# Patient Record
Sex: FEMALE | Race: WHITE | NOT HISPANIC OR LATINO | Employment: FULL TIME | URBAN - METROPOLITAN AREA
[De-identification: names, ages, dates, MRNs, and addresses within clinical notes are randomized per-mention and may not be internally consistent; named-entity substitution may affect disease eponyms.]

---

## 2021-06-29 ENCOUNTER — OFFICE VISIT (OUTPATIENT)
Dept: FAMILY MEDICINE CLINIC | Facility: CLINIC | Age: 51
End: 2021-06-29

## 2021-06-29 VITALS
DIASTOLIC BLOOD PRESSURE: 82 MMHG | RESPIRATION RATE: 18 BRPM | BODY MASS INDEX: 30.92 KG/M2 | TEMPERATURE: 98.6 F | HEIGHT: 70 IN | HEART RATE: 72 BPM | SYSTOLIC BLOOD PRESSURE: 114 MMHG | WEIGHT: 216 LBS

## 2021-06-29 DIAGNOSIS — E55.9 VITAMIN D DEFICIENCY: ICD-10-CM

## 2021-06-29 DIAGNOSIS — Z12.11 SCREEN FOR COLON CANCER: ICD-10-CM

## 2021-06-29 DIAGNOSIS — Z00.00 WELL WOMAN EXAM (NO GYNECOLOGICAL EXAM): Primary | ICD-10-CM

## 2021-06-29 DIAGNOSIS — R23.2 HOT FLASHES: ICD-10-CM

## 2021-06-29 DIAGNOSIS — Z83.42 FAMILY HISTORY OF HYPERCHOLESTEROLEMIA: ICD-10-CM

## 2021-06-29 DIAGNOSIS — Z11.59 ENCOUNTER FOR HEPATITIS C SCREENING TEST FOR LOW RISK PATIENT: ICD-10-CM

## 2021-06-29 DIAGNOSIS — R63.5 WEIGHT GAIN: ICD-10-CM

## 2021-06-29 PROBLEM — Z90.13 ACQUIRED ABSENCE OF BOTH BREASTS AND NIPPLES: Status: ACTIVE | Noted: 2017-11-01

## 2021-06-29 PROBLEM — N65.0 DEFORMITY OF RECONSTRUCTED BREAST: Status: ACTIVE | Noted: 2017-11-01

## 2021-06-29 PROCEDURE — 99386 PREV VISIT NEW AGE 40-64: CPT | Performed by: FAMILY MEDICINE

## 2021-06-29 NOTE — PROGRESS NOTES
"Subjective     Chief Complaint   Patient presents with   • [facility]       Marilou Mcgowan is a 50 y.o. female who presents for an annual exam. The patient is sexually active. GYN screening history: was normal and she had hysterectomy in the past. The patient wears seatbelts: yes. The patient participates in regular exercise: yes.  Gym 1 hour several times a week.   The patient reports that there is not domestic violence in her life.     History of abnormal Pap smear: no  Family history of uterine or ovarian cancer: no  Family history of colon cancer: no  History of abnormal mammogram: yes - she had mastectomy  Colonoscopy history:   Never had this completed      Menstrual History:  OB History        2    Para   2    Term                AB        Living           SAB        TAB        Ectopic        Molar        Multiple        Live Births                     No LMP recorded.         The following portions of the patient's history were reviewed and updated as appropriate:vital signs, allergies, current medications, past family history, past medical history, past social history, past surgical history and problem list    Review of Systems   A comprehensive review of systems was negative.   She has been stressed but notes her mood overall is good    Objective     /82   Pulse 72   Temp 98.6 °F (37 °C)   Resp 18   Ht 177.8 cm (70\")   Wt 98 kg (216 lb)   BMI 30.99 kg/m²       Physical Exam  Vitals and nursing note reviewed.   Constitutional:       General: She is not in acute distress.     Appearance: Normal appearance. She is well-developed.   HENT:      Head: Normocephalic and atraumatic.      Right Ear: Tympanic membrane, ear canal and external ear normal.      Left Ear: Tympanic membrane, ear canal and external ear normal.      Nose: Nose normal.   Eyes:      Extraocular Movements: Extraocular movements intact.      Conjunctiva/sclera: Conjunctivae normal.   Neck:      Thyroid: No " thyromegaly.   Cardiovascular:      Rate and Rhythm: Normal rate and regular rhythm.      Heart sounds: Normal heart sounds. No murmur heard.     Pulmonary:      Effort: Pulmonary effort is normal. No respiratory distress.      Breath sounds: Normal breath sounds.   Abdominal:      General: Bowel sounds are normal. There is no distension.      Palpations: Abdomen is soft.      Tenderness: There is no abdominal tenderness.   Musculoskeletal:      Cervical back: Normal range of motion and neck supple.   Lymphadenopathy:      Cervical: No cervical adenopathy.   Skin:     General: Skin is warm and dry.   Neurological:      Mental Status: She is alert and oriented to person, place, and time.   Psychiatric:         Mood and Affect: Mood normal.         Behavior: Behavior normal.         Thought Content: Thought content normal.         Judgment: Judgment normal.             Assessment/Plan     ASSESSMENT  Healthy female exam.    1. Well woman exam (no gynecological exam)    2. Hot flashes    3. Family history of hypercholesterolemia    4. Screen for colon cancer    5. Encounter for hepatitis C screening test for low risk patient    6. Vitamin D deficiency    7. Weight gain         PLAN  1.   Orders Placed This Encounter   Procedures   • Comprehensive Metabolic Panel   • Lipid Panel   • TSH+Free T4   • Vitamin D 25 Hydroxy   • Hepatitis C Antibody   • FSH & LH   • Estrogens, Total   • Progesterone   • Cologuard - Stool, Per Rectum   • CBC & Differential       2. Medications prescribed this encounter:    No orders of the defined types were placed in this encounter.      3. Counseled pt about well woman care including diet and exercise.  She is exercising and doing low carb with intermittent fasting but not losing weight, will check labs and f/u pending results.  She had hysterecomy and mastecomy so no need for pap nor mammogram.  Will set up cologuard    Will f/u pending labs        Flo Montes De Oca MD  6/29/2021

## 2021-07-06 LAB
25(OH)D3+25(OH)D2 SERPL-MCNC: 40.7 NG/ML (ref 30–100)
ALBUMIN SERPL-MCNC: 4.8 G/DL (ref 3.8–4.9)
ALBUMIN/GLOB SERPL: 2.7 {RATIO} (ref 1.2–2.2)
ALP SERPL-CCNC: 58 IU/L (ref 48–121)
ALT SERPL-CCNC: 14 IU/L (ref 0–32)
AST SERPL-CCNC: 16 IU/L (ref 0–40)
BASOPHILS # BLD AUTO: 0 X10E3/UL (ref 0–0.2)
BASOPHILS NFR BLD AUTO: 0 %
BILIRUB SERPL-MCNC: 0.4 MG/DL (ref 0–1.2)
BUN SERPL-MCNC: 10 MG/DL (ref 6–24)
BUN/CREAT SERPL: 11 (ref 9–23)
CALCIUM SERPL-MCNC: 9.3 MG/DL (ref 8.7–10.2)
CHLORIDE SERPL-SCNC: 105 MMOL/L (ref 96–106)
CHOLEST SERPL-MCNC: 196 MG/DL (ref 100–199)
CO2 SERPL-SCNC: 24 MMOL/L (ref 20–29)
CREAT SERPL-MCNC: 0.9 MG/DL (ref 0.57–1)
EOSINOPHIL # BLD AUTO: 0.1 X10E3/UL (ref 0–0.4)
EOSINOPHIL NFR BLD AUTO: 1 %
ERYTHROCYTE [DISTWIDTH] IN BLOOD BY AUTOMATED COUNT: 13 % (ref 11.7–15.4)
ESTROGEN SERPL-MCNC: 145 PG/ML
FSH SERPL-ACNC: 44.5 MIU/ML
GLOBULIN SER CALC-MCNC: 1.8 G/DL (ref 1.5–4.5)
GLUCOSE SERPL-MCNC: 91 MG/DL (ref 65–99)
HCT VFR BLD AUTO: 39.4 % (ref 34–46.6)
HCV AB S/CO SERPL IA: <0.1 S/CO RATIO (ref 0–0.9)
HDLC SERPL-MCNC: 72 MG/DL
HGB BLD-MCNC: 13.2 G/DL (ref 11.1–15.9)
IMM GRANULOCYTES # BLD AUTO: 0 X10E3/UL (ref 0–0.1)
IMM GRANULOCYTES NFR BLD AUTO: 0 %
LDLC SERPL CALC-MCNC: 110 MG/DL (ref 0–99)
LH SERPL-ACNC: 38 MIU/ML
LYMPHOCYTES # BLD AUTO: 1.3 X10E3/UL (ref 0.7–3.1)
LYMPHOCYTES NFR BLD AUTO: 21 %
MCH RBC QN AUTO: 31.2 PG (ref 26.6–33)
MCHC RBC AUTO-ENTMCNC: 33.5 G/DL (ref 31.5–35.7)
MCV RBC AUTO: 93 FL (ref 79–97)
MONOCYTES # BLD AUTO: 0.4 X10E3/UL (ref 0.1–0.9)
MONOCYTES NFR BLD AUTO: 6 %
NEUTROPHILS # BLD AUTO: 4.4 X10E3/UL (ref 1.4–7)
NEUTROPHILS NFR BLD AUTO: 72 %
PLATELET # BLD AUTO: 254 X10E3/UL (ref 150–450)
POTASSIUM SERPL-SCNC: 4.1 MMOL/L (ref 3.5–5.2)
PROGEST SERPL-MCNC: 0.4 NG/ML
PROT SERPL-MCNC: 6.6 G/DL (ref 6–8.5)
RBC # BLD AUTO: 4.23 X10E6/UL (ref 3.77–5.28)
SODIUM SERPL-SCNC: 141 MMOL/L (ref 134–144)
T4 FREE SERPL-MCNC: 0.97 NG/DL (ref 0.82–1.77)
TRIGL SERPL-MCNC: 76 MG/DL (ref 0–149)
TSH SERPL DL<=0.005 MIU/L-ACNC: 1.99 UIU/ML (ref 0.45–4.5)
VLDLC SERPL CALC-MCNC: 14 MG/DL (ref 5–40)
WBC # BLD AUTO: 6.1 X10E3/UL (ref 3.4–10.8)

## 2022-03-15 ENCOUNTER — OFFICE VISIT (OUTPATIENT)
Dept: BARIATRICS/WEIGHT MGMT | Facility: CLINIC | Age: 52
End: 2022-03-15

## 2022-03-15 VITALS
DIASTOLIC BLOOD PRESSURE: 66 MMHG | OXYGEN SATURATION: 98 % | SYSTOLIC BLOOD PRESSURE: 124 MMHG | HEART RATE: 59 BPM | RESPIRATION RATE: 18 BRPM | TEMPERATURE: 98.4 F | HEIGHT: 70 IN | WEIGHT: 213.5 LBS | BODY MASS INDEX: 30.56 KG/M2

## 2022-03-15 DIAGNOSIS — R53.83 OTHER FATIGUE: ICD-10-CM

## 2022-03-15 DIAGNOSIS — Z51.81 ENCOUNTER FOR THERAPEUTIC DRUG LEVEL MONITORING: ICD-10-CM

## 2022-03-15 DIAGNOSIS — E66.09 CLASS 1 OBESITY DUE TO EXCESS CALORIES WITHOUT SERIOUS COMORBIDITY WITH BODY MASS INDEX (BMI) OF 30.0 TO 30.9 IN ADULT: Primary | ICD-10-CM

## 2022-03-15 PROCEDURE — 99205 OFFICE O/P NEW HI 60 MIN: CPT | Performed by: NURSE PRACTITIONER

## 2022-03-15 RX ORDER — CHROMIUM 200 MCG
1 TABLET ORAL 2 TIMES DAILY WITH MEALS
Qty: 60 EACH | Refills: 0
Start: 2022-03-15

## 2022-03-15 RX ORDER — MULTIPLE VITAMINS W/ MINERALS TAB 9MG-400MCG
1 TAB ORAL DAILY
Qty: 30 TABLET | Refills: 2
Start: 2022-03-15

## 2022-03-15 RX ORDER — CHLORAL HYDRATE 500 MG
1000 CAPSULE ORAL 2 TIMES DAILY WITH MEALS
Start: 2022-03-15

## 2022-03-15 NOTE — PROGRESS NOTES
"  [facility]  [address]  [place] [address]   Office Note      Date: 03/15/2022  Patient Name: Marilou Mcgowan  MRN: [id number]  : 1970  Subjective  Subjective     Chief Complaint  Obesity Management follow-up          Marilou Mcgowan presents to [facility] for obesity management. Gained 50lb in 3 months in  due to stress eating/comfort eating. Was at 180lb prior to the shutdown doing weight loss competition at work. She recently started a carb cycling/intermittent fasting with [employer]. Has lost 10lb in the last 5 weeks. She has two family members who do this program and she would like to start a medically supervised weight loss program for education and accountability.   Highest lifetime weight: 230 pounds (). Today's weight is 96.8 kg (213 lb 8 oz) pounds.   Weight 5 years ago:190.   She has a history of stage 0 breast cancer for which she had a double mastectomy then implants. She had a ruptured appendix when she was 19 at 135lb, lost 13lb in the hospital. States she was \"too thin\". Denies any concerns about disordered eating.  Sleep is good, uses CBD if she needs it.   Denies any heart history, worries that the stress of the pandemic damaged her heart. Does have occasional chest pains when she is really stressed. She has not mentioned this to her PCP.   The patient is not exercising but she is starting with personal training group once a week. Belongs to gym, could go in the mornings. Has recumbent bike at home.  The following seem to sabotage weight loss efforts:comfort/stress eating and Pandemic    Review of Systems   Constitutional: Positive for fatigue.        Positive for weight gain   HENT: Negative for trouble swallowing.         Negative for throat swelling   Respiratory: Negative for shortness of breath and wheezing.         Negative for snoring   Cardiovascular: Positive for chest pain (when " "stressed sometimes). Negative for palpitations and leg swelling.   Gastrointestinal: Negative for abdominal pain, constipation, diarrhea, GERD and indigestion.   Endocrine: Negative for cold intolerance, heat intolerance, polydipsia, polyphagia and polyuria.        Negative for loss of hair  Negative for hirsutism     Genitourinary:        Denies menstrual irregularities   Musculoskeletal: Negative for arthralgias.        Denies exercise limitations  Denies chronic pain   Skin: Negative for dry skin.        Negative for acne   Neurological: Negative for headache and memory problem.        Negative for paresthesias   Psychiatric/Behavioral: Negative for self-injury, sleep disturbance, suicidal ideas and depressed mood. The patient is not nervous/anxious.      PHQ-9 Total Score:   2    Objective   Body mass index is 30.63 kg/m².  Body composition analysis completed and showed:   %body fat: 43.6    Measurements (in inches)  Neck: 13.2  Chest: 42  Waist: 42  Hips: 47  Thighs: 37    Vital Signs:   /66 (BP Location: Right arm, Patient Position: Sitting, Cuff Size: Adult)   Pulse 59   Temp 98.4 °F (36.9 °C) (Temporal)   Resp 18   Ht 177.8 cm (70\")   Wt 96.8 kg (213 lb 8 oz)   SpO2 98%   BMI 30.63 kg/m²     Physical Exam   General appears stated age and normal appearance   HEENT PERRLA, EOM intact, conjunctivae normal and without thyromegaly or thyroid nodules   Chest/lungs Normal rate, Regular rhythm, Breathing is unlabored and Clear to auscultation bilaterally   Abdomen Hypopigmented linear striae, Soft, normal bowel sounds, without mass or tenderness and Central adiposity   Extremities without edema   Neuro Normal DTRs, Good historian and No focal deficit   Skin Warm, dry, intact   Psych normal behavior, normal thought content and normal concentration     Result Review :                   Assessment / Plan       Diagnoses and all orders for this visit:    1. Class 1 obesity due to excess calories without " serious comorbidity with body mass index (BMI) of 30.0 to 30.9 in adult (Primary)  Assessment & Plan:  Patient's (Body mass index is 30.63 kg/m².) indicates that they are obese (BMI >30) with health conditions that include none . Weight is newly identified. BMI is is above average; BMI management plan is completed. We discussed low calorie, low carb based diet program, portion control, increasing exercise, joining a fitness center or start home based exercise program, management of depression/anxiety/stress to control compensatory eating and an michelle-based approach such as [employer] Pal or Lose It.    Topics of discussion included obesity as a disease, nutritional education on food groups, exercise, and medications. Patient was instructed in adequate protein, controlled carb and controlled fat intake.   Patient received instructions on using the medicines as a tool in controlling their weight with nutritional and behavioral changes. Risks and benefits were discussed. I believe the potential benefits of medication helping to decrease weight outweighs the risks. Patient is to try nutritonal/behavioral changes only first.   Patient received our clinic education booklet.   Our patient consent form was reviewed including potential risks of weight loss. We also reviewed our confidentiality and HIPPA statements. Patients current FITT score was reviewed along with current capability for exercise tolerance and a patient will work towards a FITT score of:     Frequency   Intensity Time Strength Training   []   0 None  []   0 None  []   0 None  []   0 None    []   1 (1-2x/week) []   1 (light) []   1 (<10 min) []   1 (1x/week)   [x]   2 (3-5x/week) [x]   2 (moderate) []   2 (10-20 min) [x]   2 (2x/week)   []   3 (daily)   []   3 (moderately hard)  []   4 (very hard) []   3 (20-30 min)  [x]   4 (>30 min) []   3 (3-4x/week)       Patient's past medical history was reviewed in detail and barriers to weight loss were identified and  discussed. Past efforts at weight reduction on their own as well as under physician supervision were documented and discussed.  I advised patient to continue routine care with their Primary Care Provider.     Nutritional recommendations and goals were reviewed including Calories:0130-0790 daily adjusted for exercise calories burnt, Protein:>100 g daily, Net carbs (total carb - fiber) of 50-75g per day.  Start to keep a food journal and bring into next visit in 2 weeks for review. Practice the behavioral modification technique of mindful eating. Take one MVI daily and 2000mg fish oil daily. Take other medications and supplements as directed.   Complete labwork.  Check on AOM coverage.  Decrease calories to 1731-6314 a day (currently at 1500 with external diet program).           Orders:  -     Insulin, Total  -     CBC & Differential  -     Comprehensive Metabolic Panel  -     Hemoglobin A1c  -     Lipid Panel  -     TSH  -     Vitamin D 25 Hydroxy  -     Chromium (Chromium GTF) 200 MCG tablet; Take 1 tablet by mouth 2 (Two) Times a Day With Meals.  Dispense: 60 each; Refill: 0  -     Omega-3 Fatty Acids (fish oil) 1000 MG capsule capsule; Take 1 capsule by mouth 2 (Two) Times a Day With Meals.  -     multivitamin with minerals (Multivitamin Adults) tablet tablet; Take 1 tablet by mouth Daily.  Dispense: 30 tablet; Refill: 2    2. Encounter for therapeutic drug level monitoring  -     Urine Drug Screen - Urine, Clean Catch    3. Other fatigue  -     CBC & Differential  -     TSH  -     Vitamin D 25 Hydroxy       I spent 76 minutes caring for Marilou on this date of service. This time includes time spent by me in the following activities:preparing for the visit, obtaining and/or reviewing a separately obtained history, performing a medically appropriate examination and/or evaluation , counseling and educating the patient/family/caregiver, ordering medications, tests, or procedures and documenting information in the  medical record  Follow Up   Return in about 2 weeks (around 3/29/2022) for Next scheduled follow up, Labs this visit.  Patient was given instructions and counseling regarding her condition or for health maintenance advice. Please see specific information pulled into the AVS if appropriate.     Marian Humphrey, WILTON  03/15/2022

## 2022-03-15 NOTE — ASSESSMENT & PLAN NOTE
Patient's (Body mass index is 30.63 kg/m².) indicates that they are obese (BMI >30) with health conditions that include none . Weight is newly identified. BMI is is above average; BMI management plan is completed. We discussed low calorie, low carb based diet program, portion control, increasing exercise, joining a fitness center or start home based exercise program, management of depression/anxiety/stress to control compensatory eating and an michelle-based approach such as [employer] Pal or Lose It.    Topics of discussion included obesity as a disease, nutritional education on food groups, exercise, and medications. Patient was instructed in adequate protein, controlled carb and controlled fat intake.   Patient received instructions on using the medicines as a tool in controlling their weight with nutritional and behavioral changes. Risks and benefits were discussed. I believe the potential benefits of medication helping to decrease weight outweighs the risks. Patient is to try nutritonal/behavioral changes only first.   Patient received our clinic education booklet.   Our patient consent form was reviewed including potential risks of weight loss. We also reviewed our confidentiality and HIPPA statements. Patients current FITT score was reviewed along with current capability for exercise tolerance and a patient will work towards a FITT score of:     Frequency   Intensity Time Strength Training   []   0 None  []   0 None  []   0 None  []   0 None    []   1 (1-2x/week) []   1 (light) []   1 (<10 min) []   1 (1x/week)   [x]   2 (3-5x/week) [x]   2 (moderate) []   2 (10-20 min) [x]   2 (2x/week)   []   3 (daily)   []   3 (moderately hard)  []   4 (very hard) []   3 (20-30 min)  [x]   4 (>30 min) []   3 (3-4x/week)       Patient's past medical history was reviewed in detail and barriers to weight loss were identified and discussed. Past efforts at weight reduction on their own as well as under physician supervision were  documented and discussed.  I advised patient to continue routine care with their Primary Care Provider.     Nutritional recommendations and goals were reviewed including Calories:6150-2298 daily adjusted for exercise calories burnt, Protein:>100 g daily, Net carbs (total carb - fiber) of 50-75g per day.  Start to keep a food journal and bring into next visit in 2 weeks for review. Practice the behavioral modification technique of mindful eating. Take one MVI daily and 2000mg fish oil daily. Take other medications and supplements as directed.   Complete labwork.  Check on AOM coverage.  Decrease calories to 2101-3165 a day (currently at 1500 with external diet program).

## 2022-03-17 LAB
25(OH)D3+25(OH)D2 SERPL-MCNC: 43.7 NG/ML (ref 30–100)
ALBUMIN SERPL-MCNC: 4.5 G/DL (ref 3.8–4.9)
ALBUMIN/GLOB SERPL: 2 {RATIO} (ref 1.2–2.2)
ALP SERPL-CCNC: 67 IU/L (ref 44–121)
ALT SERPL-CCNC: 14 IU/L (ref 0–32)
AMPHETAMINES UR QL SCN: NEGATIVE NG/ML
AST SERPL-CCNC: 20 IU/L (ref 0–40)
BARBITURATES UR QL SCN: NEGATIVE NG/ML
BASOPHILS # BLD AUTO: 0 X10E3/UL (ref 0–0.2)
BASOPHILS NFR BLD AUTO: 0 %
BENZODIAZ UR QL SCN: NEGATIVE NG/ML
BILIRUB SERPL-MCNC: 0.5 MG/DL (ref 0–1.2)
BUN SERPL-MCNC: 13 MG/DL (ref 6–24)
BUN/CREAT SERPL: 16 (ref 9–23)
BZE UR QL SCN: NEGATIVE NG/ML
CALCIUM SERPL-MCNC: 9.5 MG/DL (ref 8.7–10.2)
CANNABINOIDS UR QL SCN: NEGATIVE NG/ML
CHLORIDE SERPL-SCNC: 101 MMOL/L (ref 96–106)
CHOLEST SERPL-MCNC: 191 MG/DL (ref 100–199)
CO2 SERPL-SCNC: 25 MMOL/L (ref 20–29)
CREAT SERPL-MCNC: 0.79 MG/DL (ref 0.57–1)
CREAT UR-MCNC: 104 MG/DL (ref 20–300)
EGFRCR SERPLBLD CKD-EPI 2021: 90 ML/MIN/1.73
EOSINOPHIL # BLD AUTO: 0 X10E3/UL (ref 0–0.4)
EOSINOPHIL NFR BLD AUTO: 0 %
ERYTHROCYTE [DISTWIDTH] IN BLOOD BY AUTOMATED COUNT: 13 % (ref 11.7–15.4)
GLOBULIN SER CALC-MCNC: 2.3 G/DL (ref 1.5–4.5)
GLUCOSE SERPL-MCNC: 89 MG/DL (ref 65–99)
HBA1C MFR BLD: 5.3 % (ref 4.8–5.6)
HCT VFR BLD AUTO: 36.5 % (ref 34–46.6)
HDLC SERPL-MCNC: 80 MG/DL
HGB BLD-MCNC: 12.2 G/DL (ref 11.1–15.9)
IMM GRANULOCYTES # BLD AUTO: 0 X10E3/UL (ref 0–0.1)
IMM GRANULOCYTES NFR BLD AUTO: 0 %
INSULIN SERPL-ACNC: 5.3 UIU/ML (ref 2.6–24.9)
LABORATORY COMMENT REPORT: NORMAL
LDLC SERPL CALC-MCNC: 99 MG/DL (ref 0–99)
LYMPHOCYTES # BLD AUTO: 1.2 X10E3/UL (ref 0.7–3.1)
LYMPHOCYTES NFR BLD AUTO: 20 %
MCH RBC QN AUTO: 30.8 PG (ref 26.6–33)
MCHC RBC AUTO-ENTMCNC: 33.4 G/DL (ref 31.5–35.7)
MCV RBC AUTO: 92 FL (ref 79–97)
METHADONE UR QL SCN: NEGATIVE NG/ML
MONOCYTES # BLD AUTO: 0.4 X10E3/UL (ref 0.1–0.9)
MONOCYTES NFR BLD AUTO: 6 %
NEUTROPHILS # BLD AUTO: 4.4 X10E3/UL (ref 1.4–7)
NEUTROPHILS NFR BLD AUTO: 74 %
OPIATES UR QL SCN: NEGATIVE NG/ML
OXYCODONE+OXYMORPHONE UR QL SCN: NEGATIVE NG/ML
PCP UR QL: NEGATIVE NG/ML
PH UR: 6.3 [PH] (ref 4.5–8.9)
PLATELET # BLD AUTO: 268 X10E3/UL (ref 150–450)
POTASSIUM SERPL-SCNC: 4.4 MMOL/L (ref 3.5–5.2)
PROPOXYPH UR QL SCN: NEGATIVE NG/ML
PROT SERPL-MCNC: 6.8 G/DL (ref 6–8.5)
RBC # BLD AUTO: 3.96 X10E6/UL (ref 3.77–5.28)
SODIUM SERPL-SCNC: 143 MMOL/L (ref 134–144)
TRIGL SERPL-MCNC: 62 MG/DL (ref 0–149)
TSH SERPL DL<=0.005 MIU/L-ACNC: 1.82 UIU/ML (ref 0.45–4.5)
VLDLC SERPL CALC-MCNC: 12 MG/DL (ref 5–40)
WBC # BLD AUTO: 6 X10E3/UL (ref 3.4–10.8)

## 2022-03-29 ENCOUNTER — OFFICE VISIT (OUTPATIENT)
Dept: BARIATRICS/WEIGHT MGMT | Facility: CLINIC | Age: 52
End: 2022-03-29

## 2022-03-29 VITALS
RESPIRATION RATE: 18 BRPM | BODY MASS INDEX: 30.14 KG/M2 | HEIGHT: 70 IN | TEMPERATURE: 98 F | SYSTOLIC BLOOD PRESSURE: 122 MMHG | HEART RATE: 87 BPM | WEIGHT: 210.5 LBS | DIASTOLIC BLOOD PRESSURE: 66 MMHG | OXYGEN SATURATION: 98 %

## 2022-03-29 DIAGNOSIS — E66.8 OTHER OBESITY: Primary | ICD-10-CM

## 2022-03-29 DIAGNOSIS — Z71.3 WEIGHT LOSS COUNSELING, ENCOUNTER FOR: ICD-10-CM

## 2022-03-29 PROBLEM — E66.89 OTHER OBESITY: Status: ACTIVE | Noted: 2022-03-15

## 2022-03-29 PROCEDURE — 99214 OFFICE O/P EST MOD 30 MIN: CPT | Performed by: NURSE PRACTITIONER

## 2022-03-29 RX ORDER — PSYLLIUM SEED (WITH DEXTROSE)
1 POWDER (GRAM) ORAL 2 TIMES DAILY
Qty: 30 PACKET | Refills: 2
Start: 2022-03-29

## 2022-03-29 RX ORDER — PHENTERMINE HYDROCHLORIDE 15 MG/1
CAPSULE ORAL
Qty: 30 CAPSULE | Refills: 0 | Status: SHIPPED | OUTPATIENT
Start: 2022-03-29 | End: 2022-04-27 | Stop reason: SDUPTHER

## 2022-03-29 NOTE — ASSESSMENT & PLAN NOTE
Patient's (Body mass index is 30.2 kg/m².) indicates that they are obese (BMI >30) with health conditions that include none . Weight is improving with lifestyle modifications. BMI is is above average; BMI management plan is completed. We discussed low calorie, low carb based diet program, portion control, increasing exercise, joining a fitness center or start home based exercise program, management of depression/anxiety/stress to control compensatory eating, pharmacologic options including phentermine and an michelle-based approach such as [facility] Pal or Lose It.    I have instructed the patient to continue with pursuit of medical weight loss as a part of this program. Patient does meet criteria for use of anorectics at this time as BMI >27 and is not at treatment goal.     Continue nutritional focus and work towards new exercise FITT goal of: 2-2-4-2.  The current plan for this month includes: continue to work on lifestyle behavioral changes.   After review of Ms. Marilou Mcgowan lab work, urine drug screen, PMH, and medical risk factors, it has been decided that it is medically appropriate to use an anorectic medication for assistance of weight loss. It is felt that the risks of staying at current body fat percentage and potential adverse co-morbid conditions outweighs the risk of medication use. Medication risks and benefits were again reviewed with patient. she has signed the medication agreement form & has decided to try the use of an anorectic medication for the assistance of weight loss.

## 2022-03-29 NOTE — PROGRESS NOTES
Office Note      Date: 2022  Patient Name: Marilou Mcgowan  MRN: [id number]  : 1970       Chief Complaint  Obesity and Follow-up  Subjective  Subjective      {Problem List  Visit Diagnosis   Encounters  Notes  Medications  Labs  Result Review Imaging  Media :23}     Marilou Mcgowan presents to [facility] for obesity management. This is her initial 2 week follow up appointment. She has made the following changes: added dietary supplements we discussed, decreased calories to 3336-0951, increased water, increased physical activity. Patient is unsure with weight loss progress. Appetite is moderately controlled. Reports no side effects of prescribed medications today. The patient is taking multivitamin and is taking fish oil.  The patient is using a food journal. She is interested in starting a medication for appetite and metabolism.  The patient is exercising with a FITT score of:    Frequency Intensity Time Strength Training   []   0, none []   0 []   0 []   0   []   1 (1-2x/week) []   1 (light) []   1 (<10 min) [x]   1 (1x/week)   [x]   2 (3-5x/week) [x]   2 (moderate) []   2 (10-20 min) []   2 (2x/week)   []   3 (daily) []   3 (moderately hard)  []   4 (very hard) [x]   3 (20-30 min)  []   4 (>30 min) []   3 (3-4x/week)           Review of Systems   Constitutional: Negative for fatigue.   Eyes: Negative for visual disturbance.   Cardiovascular: Negative for chest pain and palpitations.   Gastrointestinal: Negative for abdominal pain, constipation, diarrhea, nausea and GERD.   Neurological: Negative for dizziness, tremors, light-headedness, headache and memory problem.        Parasthesias negative   Psychiatric/Behavioral: Negative for sleep disturbance and depressed mood. The patient is not nervous/anxious.        Objective    Body mass index is 30.2 kg/m².   Start weight:213.5 pounds.    Total Loss/%Loss of BBW: -1.41%  Change in weight since last  "visit: -3lb  Body composition analysis completed and showed:   %body fat: 44.5  Measurements (in inches)  Waist: 41.5  Vital Signs:   /66 (BP Location: Left arm, Patient Position: Sitting, Cuff Size: Large Adult)   Pulse 87   Temp 98 °F (36.7 °C) (Temporal)   Resp 18   Ht 177.8 cm (70\")   Wt 95.5 kg (210 lb 8 oz)   SpO2 98%   BMI 30.20 kg/m²     Physical Exam   General appears stated age and normal appearance   HEENT PERRLA, EOM intact and conjunctivae normal   Chest/lungs Normal rate, Regular rhythm and Breathing is unlabored   Extremities nonpitting edema   Neuro Good historian and No focal deficit   Skin Warm, dry, intact   Psych normal behavior, normal thought content and normal concentration     Result Review :   The following data was reviewed by: WILTON Hurt on 03/29/2022:    Common labs    Common Labsle 6/29/21 6/29/21 6/29/21 3/15/22 3/15/22 3/15/22 3/15/22    1641 1641 1641 1526 1526 1526 1526   Glucose  91   89     BUN  10   13     Creatinine  0.90   0.79     eGFR Non  Am  74        eGFR African Am  86        Sodium  141   143     Potassium  4.1   4.4     Chloride  105   101     Calcium  9.3   9.5     Total Protein  6.6   6.8     Albumin  4.8   4.5     Total Bilirubin  0.4   0.5     Alkaline Phosphatase  58   67     AST (SGOT)  16   20     ALT (SGPT)  14   14     WBC 6.1   6.0      Hemoglobin 13.2   12.2      Hematocrit 39.4   36.5      Platelets 254   268      Total Cholesterol   196    191   Triglycerides   76    62   HDL Cholesterol   72    80   LDL Cholesterol    110 (A)    99   Hemoglobin A1C      5.3    (A) Abnormal value       Comments are available for some flowsheets but are not being displayed.                    Assessment / Plan        Diagnoses and all orders for this visit:    1. Other obesity (Primary)  Assessment & Plan:  Patient's (Body mass index is 30.2 kg/m².) indicates that they are obese (BMI >30) with health conditions that include none . Weight is " improving with lifestyle modifications. BMI is is above average; BMI management plan is completed. We discussed low calorie, low carb based diet program, portion control, increasing exercise, joining a fitness center or start home based exercise program, management of depression/anxiety/stress to control compensatory eating, pharmacologic options including phentermine and an michelle-based approach such as [employer] Pal or Lose It.    I have instructed the patient to continue with pursuit of medical weight loss as a part of this program. Patient does meet criteria for use of anorectics at this time as BMI >27 and is not at treatment goal.     Continue nutritional focus and work towards new exercise FITT goal of: 2-2-4-2.  The current plan for this month includes: continue to work on lifestyle behavioral changes.   After review of Ms. Marilou Mgcowan lab work, urine drug screen, PMH, and medical risk factors, it has been decided that it is medically appropriate to use an anorectic medication for assistance of weight loss. It is felt that the risks of staying at current body fat percentage and potential adverse co-morbid conditions outweighs the risk of medication use. Medication risks and benefits were again reviewed with patient. she has signed the medication agreement form & has decided to try the use of an anorectic medication for the assistance of weight loss.          Orders:  -     phentermine 15 MG capsule; Take one capsule by mouth daily at 11am.  Dispense: 30 capsule; Refill: 0  -     psyllium (Metamucil) 28 % packet; Take 1 packet by mouth 2 (Two) Times a Day.  Dispense: 30 packet; Refill: 2    2. Weight loss counseling, encounter for      I spent 30 minutes caring for Marilou on this date of service. This time includes time spent by me in the following activities:reviewing tests, obtaining and/or reviewing a separately obtained history, performing a medically appropriate examination and/or evaluation ,  counseling and educating the patient/family/caregiver, ordering medications, tests, or procedures, documenting information in the medical record and independently interpreting results and communicating that information with the patient/family/caregiver  Follow Up   Return in about 4 weeks (around 4/26/2022) for Next scheduled follow up.  Patient was given instructions and counseling regarding her condition or for health maintenance advice. Please see specific information pulled into the AVS if appropriate.     Marian Humphrey, WILTON  03/29/2022

## 2022-04-25 ENCOUNTER — TELEPHONE (OUTPATIENT)
Dept: BARIATRICS/WEIGHT MGMT | Facility: CLINIC | Age: 52
End: 2022-04-25

## 2022-04-25 DIAGNOSIS — E66.8 OTHER OBESITY: ICD-10-CM

## 2022-04-25 NOTE — TELEPHONE ENCOUNTER
Patient is unable to get an appointment until June 2nd, requesting refill on phentermine. Please advise.     Flora

## 2022-04-27 RX ORDER — PHENTERMINE HYDROCHLORIDE 15 MG/1
CAPSULE ORAL
Qty: 30 CAPSULE | Refills: 0 | Status: SHIPPED | OUTPATIENT
Start: 2022-04-27

## 2022-08-31 ENCOUNTER — OFFICE VISIT (OUTPATIENT)
Dept: FAMILY MEDICINE CLINIC | Facility: CLINIC | Age: 52
End: 2022-08-31

## 2022-08-31 VITALS
RESPIRATION RATE: 16 BRPM | SYSTOLIC BLOOD PRESSURE: 116 MMHG | WEIGHT: 220 LBS | TEMPERATURE: 97.8 F | DIASTOLIC BLOOD PRESSURE: 76 MMHG | OXYGEN SATURATION: 97 % | HEIGHT: 70 IN | HEART RATE: 91 BPM | BODY MASS INDEX: 31.5 KG/M2

## 2022-08-31 DIAGNOSIS — Z00.00 WELL WOMAN EXAM (NO GYNECOLOGICAL EXAM): Primary | ICD-10-CM

## 2022-08-31 DIAGNOSIS — R07.89 OTHER CHEST PAIN: ICD-10-CM

## 2022-08-31 PROCEDURE — 99396 PREV VISIT EST AGE 40-64: CPT | Performed by: FAMILY MEDICINE

## 2022-08-31 PROCEDURE — 93000 ELECTROCARDIOGRAM COMPLETE: CPT | Performed by: FAMILY MEDICINE

## 2022-08-31 NOTE — PROGRESS NOTES
"Subjective     Chief Complaint   Patient presents with   • Annual Exam   • Chest Tightness     4 to 5 months       Marilou Mcgowan is a 51 y.o. female who presents for an annual exam. The patient has some complaints about her chest today. The patient is sexually active. GYN screening history: hysteectomy. The patient wears seatbelts: yes. The patient participates in regular exercise: yes.    The patient reports that there is not domestic violence in her life.     She has had some pain in her chest  Has had a lot of stress at work  Changing payroll systems as well  Does not want nor feel like she needs medicine for her mood        Menstrual History:  OB History        2    Para   2    Term                AB        Living           SAB        IAB        Ectopic        Molar        Multiple        Live Births                     No LMP recorded.         The following portions of the patient's history were reviewed and updated as appropriate:vital signs, allergies, current medications, past family history, past medical history, past social history, past surgical history and problem list    Review of Systems   Pertinent items are noted in HPI with CP  Stress but overall mood doing well  No heart palps  No SOA  No diaphoresis     Objective     /76   Pulse 91   Temp 97.8 °F (36.6 °C)   Resp 16   Ht 177.8 cm (70\")   Wt 99.8 kg (220 lb)   SpO2 97%   BMI 31.57 kg/m²         Physical Exam  Vitals and nursing note reviewed.   Constitutional:       General: She is not in acute distress.     Appearance: Normal appearance. She is well-developed.   HENT:      Head: Normocephalic and atraumatic.      Right Ear: Tympanic membrane, ear canal and external ear normal.      Left Ear: Tympanic membrane, ear canal and external ear normal.   Eyes:      Extraocular Movements: Extraocular movements intact.      Conjunctiva/sclera: Conjunctivae normal.   Neck:      Thyroid: No thyromegaly.   Cardiovascular:      " Rate and Rhythm: Normal rate and regular rhythm.      Heart sounds: Normal heart sounds. No murmur heard.  Pulmonary:      Effort: Pulmonary effort is normal. No respiratory distress.      Breath sounds: Normal breath sounds.   Abdominal:      General: Bowel sounds are normal. There is no distension.      Palpations: Abdomen is soft.      Tenderness: There is no abdominal tenderness.   Musculoskeletal:      Cervical back: Normal range of motion and neck supple.   Lymphadenopathy:      Cervical: No cervical adenopathy.   Skin:     General: Skin is warm and dry.   Neurological:      Mental Status: She is alert and oriented to person, place, and time.   Psychiatric:         Mood and Affect: Mood normal.         Behavior: Behavior normal.         Thought Content: Thought content normal.         Judgment: Judgment normal.         ECG 12 Lead    Date/Time: 8/31/2022 4:39 PM  Performed by: Flo Montes De Oca MD  Authorized by: Flo Montes De Oca MD   Comparison: not compared with previous ECG   Previous ECG: no previous ECG available  Rhythm: sinus rhythm    Clinical impression: normal ECG  Comments: RSr` noted                Assessment & Plan     ASSESSMENT  Healthy female exam.    1. Well woman exam (no gynecological exam)    2. Other chest pain         PLAN  1.   Orders Placed This Encounter   Procedures   • Treadmill Stress Test   • ECG 12 Lead       2. Medications prescribed this encounter:    No orders of the defined types were placed in this encounter.      3. Counseled pt ab out well woman care including diet and exercise. And she has started exercise recently    4. Atypical chest pain with normal EKG.  Will check stress test to make sure heart is healthy but suspect stress related symptoms        Flo Montes De Oca MD  8/31/2022

## 2022-09-14 ENCOUNTER — TELEPHONE (OUTPATIENT)
Dept: FAMILY MEDICINE CLINIC | Facility: CLINIC | Age: 52
End: 2022-09-14

## 2022-09-14 DIAGNOSIS — R07.89 OTHER CHEST PAIN: Primary | ICD-10-CM

## 2022-09-14 NOTE — TELEPHONE ENCOUNTER
Caller: Juan M Marilou LORENA    Relationship: Self    Best call back number: [phone]    What orders are you requesting (i.e. lab or imaging): STRESS TEST    In what timeframe would the patient need to come in: ASAP    Where will you receive your lab/imaging services: [facility]    Additional notes: PATIENT CALLED STATING SHE WOULD  P[REFER TO GO TO [facility] TO GET THE STRESS TEST DONE. PLEASE SUBMIT ORDER TO [facility] AND CALL PATIENT BACK

## 2022-10-03 ENCOUNTER — APPOINTMENT (OUTPATIENT)
Dept: CARDIOLOGY | Facility: HOSPITAL | Age: 52
End: 2022-10-03

## 2023-04-10 ENCOUNTER — HOSPITAL ENCOUNTER (OUTPATIENT)
Dept: CARDIOLOGY | Facility: HOSPITAL | Age: 53
Discharge: HOME | End: 2023-04-10
Admitting: FAMILY MEDICINE
Payer: COMMERCIAL

## 2023-04-10 VITALS
BODY MASS INDEX: 31.5 KG/M2 | DIASTOLIC BLOOD PRESSURE: 80 MMHG | HEIGHT: 70 IN | SYSTOLIC BLOOD PRESSURE: 120 MMHG | HEART RATE: 75 BPM | WEIGHT: 220 LBS

## 2023-04-10 DIAGNOSIS — R07.89 OTHER CHEST PAIN: ICD-10-CM

## 2023-04-10 LAB
BH CV STRESS BP STAGE 1: NORMAL
BH CV STRESS BP STAGE 2: NORMAL
BH CV STRESS BP STAGE 3: NORMAL
BH CV STRESS DURATION MIN STAGE 1: 3
BH CV STRESS DURATION MIN STAGE 2: 3
BH CV STRESS DURATION MIN STAGE 3: 3
BH CV STRESS DURATION SEC STAGE 1: 0
BH CV STRESS DURATION SEC STAGE 2: 0
BH CV STRESS DURATION SEC STAGE 3: 0
BH CV STRESS GRADE STAGE 1: 10
BH CV STRESS GRADE STAGE 2: 12
BH CV STRESS GRADE STAGE 3: 14
BH CV STRESS HR STAGE 1: 112
BH CV STRESS HR STAGE 2: 133
BH CV STRESS HR STAGE 3: 162
BH CV STRESS METS STAGE 1: 5
BH CV STRESS METS STAGE 2: 7.5
BH CV STRESS METS STAGE 3: 10
BH CV STRESS O2 STAGE 1: 96
BH CV STRESS O2 STAGE 2: 97
BH CV STRESS O2 STAGE 3: 98
BH CV STRESS PROTOCOL 1: NORMAL
BH CV STRESS RECOVERY BP: NORMAL MMHG
BH CV STRESS RECOVERY HR: 95 BPM
BH CV STRESS RECOVERY O2: 98 %
BH CV STRESS SPEED STAGE 1: 1.7
BH CV STRESS SPEED STAGE 2: 2.5
BH CV STRESS SPEED STAGE 3: 3.4
BH CV STRESS STAGE 1: 1
BH CV STRESS STAGE 2: 2
BH CV STRESS STAGE 3: 3
MAXIMAL PREDICTED HEART RATE: 167 BPM
PERCENT MAX PREDICTED HR: 97.01 %
STRESS BASELINE BP: NORMAL MMHG
STRESS BASELINE HR: 86 BPM
STRESS O2 SAT REST: 96 %
STRESS PERCENT HR: 114 %
STRESS POST ESTIMATED WORKLOAD: 10.1 METS
STRESS POST EXERCISE DUR MIN: 9 MIN
STRESS POST EXERCISE DUR SEC: 0 SEC
STRESS POST O2 SAT PEAK: 98 %
STRESS POST PEAK BP: NORMAL MMHG
STRESS POST PEAK HR: 162 BPM
STRESS TARGET HR: 142 BPM

## 2023-04-10 PROCEDURE — 93018 CV STRESS TEST I&R ONLY: CPT | Performed by: INTERNAL MEDICINE

## 2023-04-10 PROCEDURE — 93017 CV STRESS TEST TRACING ONLY: CPT

## 2023-04-25 DIAGNOSIS — R07.89 OTHER CHEST PAIN: Primary | ICD-10-CM

## 2024-09-20 ENCOUNTER — TRANSCRIBE ORDERS (OUTPATIENT)
Dept: ADMINISTRATIVE | Facility: HOSPITAL | Age: 54
End: 2024-09-20
Payer: COMMERCIAL

## 2024-09-20 DIAGNOSIS — R07.89 RIGHT-SIDED CHEST WALL PAIN: Primary | ICD-10-CM
